# Patient Record
Sex: MALE | Race: WHITE
[De-identification: names, ages, dates, MRNs, and addresses within clinical notes are randomized per-mention and may not be internally consistent; named-entity substitution may affect disease eponyms.]

---

## 2023-06-09 ENCOUNTER — APPOINTMENT (OUTPATIENT)
Dept: VASCULAR SURGERY | Facility: CLINIC | Age: 43
End: 2023-06-09
Payer: COMMERCIAL

## 2023-06-09 VITALS
HEIGHT: 73 IN | DIASTOLIC BLOOD PRESSURE: 93 MMHG | HEART RATE: 92 BPM | SYSTOLIC BLOOD PRESSURE: 155 MMHG | WEIGHT: 315 LBS | BODY MASS INDEX: 41.75 KG/M2

## 2023-06-09 DIAGNOSIS — Z78.9 OTHER SPECIFIED HEALTH STATUS: ICD-10-CM

## 2023-06-09 PROBLEM — Z00.00 ENCOUNTER FOR PREVENTIVE HEALTH EXAMINATION: Status: ACTIVE | Noted: 2023-06-09

## 2023-06-09 PROCEDURE — 99204 OFFICE O/P NEW MOD 45 MIN: CPT

## 2023-06-09 PROCEDURE — 93970 EXTREMITY STUDY: CPT

## 2023-06-09 RX ORDER — CLOBETASOL PROPIONATE 0.5 MG/G
0.05 OINTMENT TOPICAL DAILY
Qty: 1 | Refills: 3 | Status: ACTIVE | COMMUNITY
Start: 2023-06-09 | End: 1900-01-01

## 2023-06-09 NOTE — PHYSICAL EXAM
[Respiratory Effort] : normal respiratory effort [2+] : left 2+ [Ankle Swelling (On Exam)] : present [Ankle Swelling Bilaterally] : severe [] : bilaterally [Ankle Swelling On The Left] : moderate [No Rash or Lesion] : No rash or lesion [Alert] : alert [Oriented to Person] : oriented to person [Oriented to Place] : oriented to place [Oriented to Time] : oriented to time [Calm] : calm [Varicose Veins Of Lower Extremities] : not present [de-identified] : Pleasant, obese [FreeTextEntry1] : LEs well perfused however w/ moderate to severe mild pitting edema, no open sores or wounds. Distal calves and feet with skin hyperpigmentation. RLE: medial aspect, right above the ankle with ~ 8x8cm area with erythema, warm to touch, and slightly tender with some skin peeling.  [de-identified] : round, large pannus [de-identified] : FROM [de-identified] : See cardiovascular section

## 2023-06-09 NOTE — PROCEDURE
[FreeTextEntry1] : Venous duplex ordered to r/o insuff, shows: negative SVT/DVT. No reflux in either GSV.

## 2023-06-09 NOTE — ASSESSMENT
[Arterial/Venous Disease] : arterial/venous disease [Medication Management] : medication management [FreeTextEntry1] : 41 y/o M w/ current RLE cellulitis, chronic BLEs swelling and morbid obesity.\par On exam, LEs well perfused however w/ moderate to severe mild pitting edema, no open sores or wounds. Distal calves and feet with skin hyperpigmentation. RLE: medial aspect, right above the ankle with ~ 8x8cm area with erythema, warm to touch, and slightly tender with some skin peeling. \par Venous duplex showed no DVT/SVT. No reflux in either GSV. \par \par Plan:\par -Rest for 2-3 days with legs elevated above the chest level\par -Start Clobetasol over the affected area once a day\par -Start Augmentin until seen by ID next Wed. If area does not improve or worsens, pt to contact us for admission for IV antibiotics\par -Daily use of compression stockings, Rx provided 20-30mmHg. For RLE, ACE bandages while cellulitis present.\par -Weight loss highly encouraged as this is exacerbating the LE symptoms. No evidence of venous reflux and no indication for any vascular procedures at this time\par -Keep skin moisturized daily\par -Low salt intake to help control the edema/fluid retention\par -Daily walking, activity and prevent sitting or standing for too long\par -F/u prn. He knows to call with any questions/concerns

## 2023-06-09 NOTE — HISTORY OF PRESENT ILLNESS
[FreeTextEntry1] : 41 y/o M referred by [facility]. He has a PMHx of HLD, L torn foot ligament (yrs ago) and obesity class III (BMI 45.56). Non smoker. He presents with RLE cellulitis and bilateral leg pain with venous disease. He explains being seen at another facility and told he needs a vein procedure given this is what is causing the cellulitis to occur. He has had the cellulitis since early May. He explains going to [facility] on a trip and had to go to an urgent care because of pain and redness over the area. He was started on Keflex at the time and was taking Motrin, almost around the clock. He then came back to [place] and was switched to Doxycycline. He was not able to tolerate it given severe GI symptoms and 2 days later was switched to Augmentin. He complete 10 days of Augmentin and the leg was feeling better however, the redness came back along with the pain. He then was started on oral Vancomycin which he has been taking for ~2 days now. He has an upcoming appointment with an ID doctor next Wed 6/14. Denies any fever/chills, leg trauma, open sores/ulcers, bulging varicose vein or vein procedures, or hx of DVT/SVT. The red area has not gotten larger or worse than before. He does reports chronic leg swelling with pain and skin discoloration. He does not wear compression stockings. He brings records from the other facility and wants to have the procedure here if it is needed.\par \par Diet is high in sodium. He lives between [place] and [place]. 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 \par Accompanied by mother.

## 2023-06-27 ENCOUNTER — APPOINTMENT (OUTPATIENT)
Dept: INFECTIOUS DISEASE | Facility: CLINIC | Age: 43
End: 2023-06-27
Payer: COMMERCIAL

## 2023-06-27 VITALS
OXYGEN SATURATION: 96 % | WEIGHT: 315 LBS | HEIGHT: 73 IN | DIASTOLIC BLOOD PRESSURE: 78 MMHG | TEMPERATURE: 97.2 F | HEART RATE: 86 BPM | SYSTOLIC BLOOD PRESSURE: 121 MMHG | BODY MASS INDEX: 41.75 KG/M2

## 2023-06-27 DIAGNOSIS — L03.115 CELLULITIS OF RIGHT LOWER LIMB: ICD-10-CM

## 2023-06-27 DIAGNOSIS — I87.2 VENOUS INSUFFICIENCY (CHRONIC) (PERIPHERAL): ICD-10-CM

## 2023-06-27 PROCEDURE — 99204 OFFICE O/P NEW MOD 45 MIN: CPT

## 2023-06-27 RX ORDER — AMOXICILLIN AND CLAVULANATE POTASSIUM 875; 125 MG/1; MG/1
875-125 TABLET, COATED ORAL TWICE DAILY
Qty: 14 | Refills: 0 | Status: DISCONTINUED | COMMUNITY
Start: 2023-06-09 | End: 2023-06-27

## 2023-06-27 NOTE — ASSESSMENT
[FreeTextEntry1] : 43 yo male with obesity, venous insufficiency, presenting for ID evaluation for RLE SSTI. Process appears to be resolving s/p recent two-week course of Augmentin. Advise present observation off antibiotics. Reinforced preventive measures recommended by vascular surgery including compression stalking and TLC. Encouraged RLE elevation and emollient lotion to R foot. Counseled on signs/symptoms of recurrent cellulitis--if mild recurrence, reasonable to retrial Augmentin given good response to this; if more severe, then counseled would need admission for IV antibiotics.\par \par rtc prn [Risk Reduction] : risk reduction [Medical Care Issues] : medical care issues [Anticipatory Guidance] : anticipatory guidance

## 2023-06-27 NOTE — PHYSICAL EXAM
[General Appearance - Alert] : alert [General Appearance - In No Acute Distress] : in no acute distress [Musculoskeletal - Swelling] : no joint swelling [Nail Clubbing] : no clubbing  or cyanosis of the fingernails [Motor Tone] : muscle strength and tone were normal [FreeTextEntry1] : patch of redness/hyperpigmentation ~6cm X 6cm above R medial malleolus; not warmer compared to surrounding skin; trace edema anterior shin; no TTP [Oriented To Time, Place, And Person] : oriented to person, place, and time [Affect] : the affect was normal

## 2023-06-27 NOTE — HISTORY OF PRESENT ILLNESS
[FreeTextEntry1] : 43 yo male with obesity, venous insufficiency, presenting for ID evaluation for RLE SSTI. Process began ~ 2 months ago in early May. Denies any inciting trauma including animal bites. Believes this started due to skin breakdown related to venous stasis. He develops LE pain worse towards end of day associated with some LE edema. He had developed a patch of redness above medial ankle--was painful. He was diagnosed with cellulitis and has been treated with multiple courses of antibiotics--initially cephalexin 1g q8h to which the patient had no response, then changed to doxycycline which was discontinued after two days due to GI interolance, then TMP/SMX and then linezolid with partial response. No open wound, purulence, or fluctuance. This was the first time in his life he developed cellulitis. Most recently, he saw vascular surgery who rx Augmentin 6/9--course was extended by his PCP to 2 weeks which he completed 4 days ago with good response. Today, he notes stable pain over medial ankle and anterior shin; no issues with bearing weight. No fever or chills. Using compression stalking and topical clobetasol as directed by vascular surgery.

## 2023-07-02 RX ORDER — CEPHALEXIN 500 MG/1
500 TABLET ORAL EVERY 6 HOURS
Qty: 28 | Refills: 0 | Status: ACTIVE | COMMUNITY
Start: 2023-07-02 | End: 1900-01-01

## 2023-07-02 RX ORDER — DOXYCYCLINE 100 MG/1
100 TABLET, FILM COATED ORAL
Qty: 14 | Refills: 0 | Status: ACTIVE | COMMUNITY
Start: 2023-07-02 | End: 1900-01-01